# Patient Record
Sex: FEMALE | Race: WHITE | NOT HISPANIC OR LATINO | Employment: FULL TIME | URBAN - METROPOLITAN AREA
[De-identification: names, ages, dates, MRNs, and addresses within clinical notes are randomized per-mention and may not be internally consistent; named-entity substitution may affect disease eponyms.]

---

## 2023-04-12 PROBLEM — R63.5 UNEXPLAINED WEIGHT GAIN: Status: ACTIVE | Noted: 2023-04-12

## 2023-04-12 PROBLEM — R53.81 MALAISE AND FATIGUE: Status: ACTIVE | Noted: 2023-04-12

## 2023-04-12 PROBLEM — J30.2 SEASONAL ALLERGIES: Status: ACTIVE | Noted: 2023-04-12

## 2023-04-12 PROBLEM — R51.9 HEADACHE, ACUTE: Status: ACTIVE | Noted: 2023-04-12

## 2023-04-12 PROBLEM — N89.8 VAGINAL DRYNESS: Status: ACTIVE | Noted: 2023-04-12

## 2023-04-12 PROBLEM — R53.83 FATIGUE: Status: ACTIVE | Noted: 2023-04-12

## 2023-04-12 PROBLEM — K21.9 LARYNGOPHARYNGEAL REFLUX (LPR): Status: ACTIVE | Noted: 2023-04-12

## 2023-04-12 PROBLEM — I10 BENIGN ESSENTIAL HYPERTENSION: Status: ACTIVE | Noted: 2023-04-12

## 2023-04-12 PROBLEM — U07.1 COVID-19 VIRUS INFECTION: Status: ACTIVE | Noted: 2023-04-12

## 2023-04-12 PROBLEM — R09.A2 GLOBUS SENSATION: Status: ACTIVE | Noted: 2023-04-12

## 2023-04-12 PROBLEM — R31.1 BENIGN MICROSCOPIC HEMATURIA: Status: ACTIVE | Noted: 2023-04-12

## 2023-04-12 PROBLEM — L40.9 PSORIASIS OF SCALP: Status: ACTIVE | Noted: 2023-04-12

## 2023-04-12 PROBLEM — Z86.2 HISTORY OF ANEMIA: Status: ACTIVE | Noted: 2023-04-12

## 2023-04-12 PROBLEM — G43.909 MIGRAINE: Status: ACTIVE | Noted: 2023-04-12

## 2023-04-12 PROBLEM — R30.0 BURNING WITH URINATION: Status: ACTIVE | Noted: 2023-04-12

## 2023-04-12 PROBLEM — M54.50 ACUTE EXACERBATION OF CHRONIC LOW BACK PAIN: Status: ACTIVE | Noted: 2023-04-12

## 2023-04-12 PROBLEM — L98.9 SKIN LESION OF FACE: Status: ACTIVE | Noted: 2023-04-12

## 2023-04-12 PROBLEM — R10.9 ABDOMINAL PAIN OF MULTIPLE SITES: Status: ACTIVE | Noted: 2023-04-12

## 2023-04-12 PROBLEM — J31.0 CHRONIC RHINITIS: Status: ACTIVE | Noted: 2023-04-12

## 2023-04-12 PROBLEM — M79.641 PAIN OF RIGHT HAND: Status: ACTIVE | Noted: 2023-04-12

## 2023-04-12 PROBLEM — K64.4 EXTERNAL HEMORRHOIDS: Status: ACTIVE | Noted: 2023-04-12

## 2023-04-12 PROBLEM — R41.3 MEMORY CHANGES: Status: ACTIVE | Noted: 2023-04-12

## 2023-04-12 PROBLEM — R68.82 DECREASED LIBIDO: Status: ACTIVE | Noted: 2023-04-12

## 2023-04-12 PROBLEM — E03.9 ACQUIRED HYPOTHYROIDISM: Status: ACTIVE | Noted: 2023-04-12

## 2023-04-12 PROBLEM — J01.90 ACUTE NON-RECURRENT SINUSITIS: Status: ACTIVE | Noted: 2023-04-12

## 2023-04-12 PROBLEM — R30.9 PAINFUL URINATION: Status: ACTIVE | Noted: 2023-04-12

## 2023-04-12 PROBLEM — R51.9 CHRONIC HEADACHES: Status: ACTIVE | Noted: 2023-04-12

## 2023-04-12 PROBLEM — G89.29 CHRONIC HEADACHES: Status: ACTIVE | Noted: 2023-04-12

## 2023-04-12 PROBLEM — F51.01 PRIMARY INSOMNIA: Status: ACTIVE | Noted: 2023-04-12

## 2023-04-12 PROBLEM — G89.29 ACUTE EXACERBATION OF CHRONIC LOW BACK PAIN: Status: ACTIVE | Noted: 2023-04-12

## 2023-04-12 PROBLEM — M79.646 THUMB PAIN: Status: ACTIVE | Noted: 2023-04-12

## 2023-04-12 PROBLEM — R06.83 SNORING: Status: ACTIVE | Noted: 2023-04-12

## 2023-04-12 PROBLEM — Z86.59 HISTORY OF PANIC ATTACKS: Status: ACTIVE | Noted: 2023-04-12

## 2023-04-12 PROBLEM — R49.9 CHANGE IN VOICE: Status: ACTIVE | Noted: 2023-04-12

## 2023-04-12 PROBLEM — H91.90 SUBJECTIVE HEARING LOSS: Status: ACTIVE | Noted: 2023-04-12

## 2023-04-12 PROBLEM — R41.840 LACK OF CONCENTRATION: Status: ACTIVE | Noted: 2023-04-12

## 2023-04-12 PROBLEM — R09.81 NASAL CONGESTION: Status: ACTIVE | Noted: 2023-04-12

## 2023-04-12 PROBLEM — K64.4 ANAL SKIN TAG: Status: ACTIVE | Noted: 2023-04-12

## 2023-04-12 PROBLEM — N39.0 ACUTE UTI: Status: ACTIVE | Noted: 2023-04-12

## 2023-04-12 PROBLEM — R41.89 BRAIN FOG: Status: ACTIVE | Noted: 2023-04-12

## 2023-04-12 PROBLEM — R09.82 POST-NASAL DRAINAGE: Status: ACTIVE | Noted: 2023-04-12

## 2023-04-12 PROBLEM — J02.9 ACUTE PHARYNGITIS: Status: ACTIVE | Noted: 2023-04-12

## 2023-04-12 PROBLEM — F41.8 DEPRESSION WITH ANXIETY: Status: ACTIVE | Noted: 2023-04-12

## 2023-04-12 PROBLEM — R30.0 DYSURIA: Status: ACTIVE | Noted: 2023-04-12

## 2023-04-12 PROBLEM — J34.89 OTHER SPECIFIED DISORDERS OF NOSE AND NASAL SINUSES: Status: ACTIVE | Noted: 2023-04-12

## 2023-04-12 PROBLEM — M75.81 TENDINITIS OF RIGHT ROTATOR CUFF: Status: ACTIVE | Noted: 2023-04-12

## 2023-04-12 PROBLEM — R80.9 PROTEIN IN URINE: Status: ACTIVE | Noted: 2023-04-12

## 2023-04-12 PROBLEM — K59.04 CHRONIC IDIOPATHIC CONSTIPATION: Status: ACTIVE | Noted: 2023-04-12

## 2023-04-12 PROBLEM — R53.83 MALAISE AND FATIGUE: Status: ACTIVE | Noted: 2023-04-12

## 2023-04-12 RX ORDER — NIFEDIPINE 30 MG/1
1 TABLET, FILM COATED, EXTENDED RELEASE ORAL DAILY
COMMUNITY
Start: 2022-09-08 | End: 2023-04-14 | Stop reason: SINTOL

## 2023-04-12 RX ORDER — BUPROPION HYDROCHLORIDE 300 MG/1
TABLET ORAL
COMMUNITY
End: 2023-04-14

## 2023-04-12 RX ORDER — BUPROPION HYDROCHLORIDE 150 MG/1
1 TABLET ORAL DAILY
COMMUNITY
Start: 2020-09-04 | End: 2023-04-14

## 2023-04-12 RX ORDER — FLUOXETINE HYDROCHLORIDE 20 MG/1
1 CAPSULE ORAL DAILY
COMMUNITY
Start: 2022-09-08

## 2023-04-12 RX ORDER — IBUPROFEN 800 MG/1
TABLET ORAL
COMMUNITY
Start: 2019-04-03 | End: 2023-04-14 | Stop reason: ALTCHOICE

## 2023-04-12 RX ORDER — FLUOXETINE HYDROCHLORIDE 40 MG/1
1 CAPSULE ORAL DAILY
COMMUNITY
Start: 2022-05-09 | End: 2023-04-14

## 2023-04-12 RX ORDER — LEVOTHYROXINE SODIUM 100 UG/1
1 TABLET ORAL DAILY
COMMUNITY
Start: 2021-03-30 | End: 2023-05-10 | Stop reason: SDUPTHER

## 2023-04-12 RX ORDER — TRAZODONE HYDROCHLORIDE 50 MG/1
1 TABLET ORAL NIGHTLY
COMMUNITY
Start: 2020-05-18 | End: 2023-08-05

## 2023-04-12 RX ORDER — CHLORPHENIRAMINE MALEATE 4 MG
1 TABLET ORAL 4 TIMES DAILY
COMMUNITY
Start: 2022-05-06 | End: 2023-04-14

## 2023-04-12 RX ORDER — CLOBETASOL PROPIONATE 0.46 MG/ML
SOLUTION TOPICAL
COMMUNITY
Start: 2019-11-22 | End: 2023-11-15 | Stop reason: SDUPTHER

## 2023-04-14 ENCOUNTER — OFFICE VISIT (OUTPATIENT)
Dept: PRIMARY CARE | Facility: CLINIC | Age: 43
End: 2023-04-14
Payer: COMMERCIAL

## 2023-04-14 ENCOUNTER — APPOINTMENT (OUTPATIENT)
Dept: PRIMARY CARE | Facility: CLINIC | Age: 43
End: 2023-04-14
Payer: COMMERCIAL

## 2023-04-14 VITALS
HEART RATE: 86 BPM | DIASTOLIC BLOOD PRESSURE: 77 MMHG | SYSTOLIC BLOOD PRESSURE: 128 MMHG | TEMPERATURE: 98.5 F | BODY MASS INDEX: 30.18 KG/M2 | RESPIRATION RATE: 20 BRPM | WEIGHT: 165 LBS

## 2023-04-14 DIAGNOSIS — R63.5 UNEXPLAINED WEIGHT GAIN: ICD-10-CM

## 2023-04-14 DIAGNOSIS — E03.9 ACQUIRED HYPOTHYROIDISM: ICD-10-CM

## 2023-04-14 DIAGNOSIS — R60.1 GENERALIZED EDEMA: Primary | ICD-10-CM

## 2023-04-14 DIAGNOSIS — R00.2 HEART PALPITATIONS: ICD-10-CM

## 2023-04-14 DIAGNOSIS — R80.9 PROTEINURIA, UNSPECIFIED TYPE: ICD-10-CM

## 2023-04-14 PROBLEM — J01.90 ACUTE NON-RECURRENT SINUSITIS: Status: RESOLVED | Noted: 2023-04-12 | Resolved: 2023-04-14

## 2023-04-14 PROBLEM — G89.29 ACUTE EXACERBATION OF CHRONIC LOW BACK PAIN: Status: RESOLVED | Noted: 2023-04-12 | Resolved: 2023-04-14

## 2023-04-14 PROBLEM — J02.9 ACUTE PHARYNGITIS: Status: RESOLVED | Noted: 2023-04-12 | Resolved: 2023-04-14

## 2023-04-14 PROBLEM — M54.50 ACUTE EXACERBATION OF CHRONIC LOW BACK PAIN: Status: RESOLVED | Noted: 2023-04-12 | Resolved: 2023-04-14

## 2023-04-14 LAB
ERYTHROCYTE DISTRIBUTION WIDTH (RATIO) BY AUTOMATED COUNT: 12.1 % (ref 11.5–14.5)
ERYTHROCYTE MEAN CORPUSCULAR HEMOGLOBIN CONCENTRATION (G/DL) BY AUTOMATED: 33.3 G/DL (ref 32–36)
ERYTHROCYTE MEAN CORPUSCULAR VOLUME (FL) BY AUTOMATED COUNT: 87 FL (ref 80–100)
ERYTHROCYTES (10*6/UL) IN BLOOD BY AUTOMATED COUNT: 5.16 X10E12/L (ref 4–5.2)
HEMATOCRIT (%) IN BLOOD BY AUTOMATED COUNT: 45 % (ref 36–46)
HEMOGLOBIN (G/DL) IN BLOOD: 15 G/DL (ref 12–16)
LEUKOCYTES (10*3/UL) IN BLOOD BY AUTOMATED COUNT: 10.3 X10E9/L (ref 4.4–11.3)
NRBC (PER 100 WBCS) BY AUTOMATED COUNT: 0 /100 WBC (ref 0–0)
PLATELETS (10*3/UL) IN BLOOD AUTOMATED COUNT: 291 X10E9/L (ref 150–450)

## 2023-04-14 PROCEDURE — 81003 URINALYSIS AUTO W/O SCOPE: CPT

## 2023-04-14 PROCEDURE — 82570 ASSAY OF URINE CREATININE: CPT

## 2023-04-14 PROCEDURE — 3074F SYST BP LT 130 MM HG: CPT | Performed by: INTERNAL MEDICINE

## 2023-04-14 PROCEDURE — 86038 ANTINUCLEAR ANTIBODIES: CPT

## 2023-04-14 PROCEDURE — 84443 ASSAY THYROID STIM HORMONE: CPT

## 2023-04-14 PROCEDURE — 82043 UR ALBUMIN QUANTITATIVE: CPT

## 2023-04-14 PROCEDURE — 85027 COMPLETE CBC AUTOMATED: CPT

## 2023-04-14 PROCEDURE — 99214 OFFICE O/P EST MOD 30 MIN: CPT | Performed by: INTERNAL MEDICINE

## 2023-04-14 PROCEDURE — 80053 COMPREHEN METABOLIC PANEL: CPT

## 2023-04-14 PROCEDURE — 36415 COLL VENOUS BLD VENIPUNCTURE: CPT | Performed by: INTERNAL MEDICINE

## 2023-04-14 PROCEDURE — 86160 COMPLEMENT ANTIGEN: CPT

## 2023-04-14 PROCEDURE — 3078F DIAST BP <80 MM HG: CPT | Performed by: INTERNAL MEDICINE

## 2023-04-14 ASSESSMENT — ENCOUNTER SYMPTOMS
NAUSEA: 0
NUMBNESS: 0
FREQUENCY: 0
DIARRHEA: 0
HEADACHES: 0
UNEXPECTED WEIGHT CHANGE: 1
DIFFICULTY URINATING: 0
FEVER: 0
CONSTIPATION: 1
CHEST TIGHTNESS: 0
ABDOMINAL PAIN: 0
VOMITING: 0
COUGH: 0
SHORTNESS OF BREATH: 0
CHILLS: 0
FATIGUE: 0
PALPITATIONS: 1
DYSURIA: 0
LIGHT-HEADEDNESS: 0

## 2023-04-14 NOTE — ASSESSMENT & PLAN NOTE
Could be side effect from nifedipine  Will have her stop use  Will monitor BP off medication-pt will monitor at home  If elevates we will start new medication

## 2023-04-14 NOTE — PATIENT INSTRUCTIONS
Stop Nifedipine as I am concerned swelling may be side effect from its use   We did blood work today and will call if anything is abnormal  Stop liquid IV packet use and monitor and lessen salt in diet  Based on results we will decide if new med needed for blood pressure and edema  Follow up on May 10th as scheduled  Take BP at home-goal is <140/90 consistently-call sooner if you see this goes up

## 2023-04-14 NOTE — ASSESSMENT & PLAN NOTE
Pt up in weight again for unknown reasons  We will test her thyroid to make sure not the issue  ? If from psych meds

## 2023-04-14 NOTE — ASSESSMENT & PLAN NOTE
Concern for some sort of autoimmune process involving kidney function with protein in urine in past history and now edema  Will do labs including complement and lj testing

## 2023-04-14 NOTE — PROGRESS NOTES
Subjective   Patient ID: Carol Ch is a 42 y.o. female who presents for Edema.    Edema    Associated symptoms include palpitations.   Pertinent negative symptoms include no abdominal pain, no cough, no fatigue, no fever, no nausea and no vomiting.       Pt here in acute visit.  She has noted edema of her hands/feet and face.  She tells me it seems to correlate somewhat with her menstrual cycle but also with when drinking alcohol.  She was using liquid IV packets 1-2 packets a day thinking she needed this for hydration.  She stopped use 3 weeks ago when she realized this may be cause of swelling.  Her weight is up 20 lbs since July of 2022.      She will note increase edema after flying.  She will sometimes note heart racing.  She has history of protein in urine and her family has some kidney history.      Review of Systems   Constitutional:  Positive for unexpected weight change. Negative for chills, fatigue and fever.   Respiratory:  Negative for cough, chest tightness and shortness of breath.    Cardiovascular:  Positive for palpitations and leg swelling.        Edema of hands/face    Gastrointestinal:  Positive for constipation. Negative for abdominal pain, diarrhea, nausea and vomiting.   Genitourinary:  Negative for difficulty urinating, dysuria and frequency.   Neurological:  Negative for light-headedness, numbness and headaches.       Objective   /77   Pulse 86   Temp 36.9 °C (98.5 °F)   Resp 20   Wt 74.8 kg (165 lb)   BMI 30.18 kg/m²    Physical Exam  Constitutional:       Appearance: Normal appearance.   Cardiovascular:      Rate and Rhythm: Normal rate and regular rhythm.      Pulses: Normal pulses.      Heart sounds: Normal heart sounds.   Pulmonary:      Effort: Pulmonary effort is normal.      Breath sounds: Normal breath sounds.   Abdominal:      General: Abdomen is flat. Bowel sounds are normal. There is no distension.      Palpations: Abdomen is soft.      Tenderness: There is no  abdominal tenderness.   Musculoskeletal:      Right lower leg: No edema.      Left lower leg: No edema.      Comments: No edema noted at today's exam    Skin:     Findings: No rash.   Neurological:      Mental Status: She is alert and oriented to person, place, and time.   Psychiatric:         Mood and Affect: Mood normal.         Assessment/Plan   Problem List Items Addressed This Visit          Endocrine/Metabolic    Acquired hypothyroidism    Relevant Orders    TSH    Unexplained weight gain     Pt up in weight again for unknown reasons  We will test her thyroid to make sure not the issue  ? If from psych meds             Other    Protein in urine     Concern for some sort of autoimmune process involving kidney function with protein in urine in past history and now edema  Will do labs including complement and lj testing          Relevant Orders    Albumin , Urine Random    Urinalysis with Reflex Microscopic    C3 complement    C4 complement    LJ with Reflex to JUAN    Generalized edema     Could be side effect from nifedipine  Will have her stop use  Will monitor BP off medication-pt will monitor at home  If elevates we will start new medication          Relevant Orders    Comprehensive Metabolic Panel    CBC    Albumin , Urine Random    Urinalysis with Reflex Microscopic    C3 complement    C4 complement    LJ with Reflex to JUAN     Other Visit Diagnoses       Heart palpitations    -  Primary    Relevant Orders    Comprehensive Metabolic Panel

## 2023-04-15 LAB
ALANINE AMINOTRANSFERASE (SGPT) (U/L) IN SER/PLAS: 14 U/L (ref 7–45)
ALBUMIN (G/DL) IN SER/PLAS: 4.4 G/DL (ref 3.4–5)
ALBUMIN (MG/L) IN URINE: <7 MG/L
ALBUMIN/CREATININE (UG/MG) IN URINE: NORMAL UG/MG CRT (ref 0–30)
ALKALINE PHOSPHATASE (U/L) IN SER/PLAS: 76 U/L (ref 33–110)
ANION GAP IN SER/PLAS: 15 MMOL/L (ref 10–20)
APPEARANCE, URINE: NORMAL
ASPARTATE AMINOTRANSFERASE (SGOT) (U/L) IN SER/PLAS: 16 U/L (ref 9–39)
BILIRUBIN TOTAL (MG/DL) IN SER/PLAS: 0.5 MG/DL (ref 0–1.2)
BILIRUBIN, URINE: NEGATIVE
BLOOD, URINE: NEGATIVE
CALCIUM (MG/DL) IN SER/PLAS: 9.5 MG/DL (ref 8.6–10.6)
CARBON DIOXIDE, TOTAL (MMOL/L) IN SER/PLAS: 23 MMOL/L (ref 21–32)
CHLORIDE (MMOL/L) IN SER/PLAS: 105 MMOL/L (ref 98–107)
COLOR, URINE: YELLOW
COMPLEMENT C3 (MG/DL) IN SER/PLAS: 142 MG/DL (ref 87–200)
COMPLEMENT C4 (MG/DL) IN SER/PLAS: 39 MG/DL (ref 10–50)
CREATININE (MG/DL) IN SER/PLAS: 0.95 MG/DL (ref 0.5–1.05)
CREATININE (MG/DL) IN URINE: 142 MG/DL (ref 20–320)
GFR FEMALE: 77 ML/MIN/1.73M2
GLUCOSE (MG/DL) IN SER/PLAS: 105 MG/DL (ref 74–99)
GLUCOSE, URINE: NEGATIVE MG/DL
KETONES, URINE: NEGATIVE MG/DL
LEUKOCYTE ESTERASE, URINE: NEGATIVE
NITRITE, URINE: NEGATIVE
PH, URINE: 6 (ref 5–8)
POTASSIUM (MMOL/L) IN SER/PLAS: 4 MMOL/L (ref 3.5–5.3)
PROTEIN TOTAL: 7.3 G/DL (ref 6.4–8.2)
PROTEIN, URINE: NEGATIVE MG/DL
SODIUM (MMOL/L) IN SER/PLAS: 139 MMOL/L (ref 136–145)
SPECIFIC GRAVITY, URINE: 1.02 (ref 1–1.03)
THYROTROPIN (MIU/L) IN SER/PLAS BY DETECTION LIMIT <= 0.05 MIU/L: 2.46 MIU/L (ref 0.44–3.98)
UREA NITROGEN (MG/DL) IN SER/PLAS: 18 MG/DL (ref 6–23)
UROBILINOGEN, URINE: <2 MG/DL (ref 0–1.9)

## 2023-04-17 LAB — ANTI-NUCLEAR ANTIBODY (ANA): NEGATIVE

## 2023-04-20 ENCOUNTER — APPOINTMENT (OUTPATIENT)
Dept: PRIMARY CARE | Facility: CLINIC | Age: 43
End: 2023-04-20
Payer: COMMERCIAL

## 2023-04-24 PROBLEM — F33.0 MAJOR DEPRESSIVE DISORDER, RECURRENT EPISODE, MILD (CMS-HCC): Status: ACTIVE | Noted: 2020-10-12

## 2023-04-24 PROBLEM — F41.9 ANXIETY DISORDER: Status: ACTIVE | Noted: 2021-01-11

## 2023-04-24 RX ORDER — BUPROPION HYDROCHLORIDE 150 MG/1
150 TABLET ORAL
COMMUNITY
Start: 2021-02-08 | End: 2023-05-10 | Stop reason: ALTCHOICE

## 2023-04-24 RX ORDER — VERAPAMIL HYDROCHLORIDE 120 MG/1
120 TABLET, FILM COATED, EXTENDED RELEASE ORAL
COMMUNITY
End: 2023-05-10 | Stop reason: ALTCHOICE

## 2023-04-24 RX ORDER — NORETHINDRONE ACETATE AND ETHINYL ESTRADIOL 1MG-20(21)
KIT ORAL
COMMUNITY
Start: 2010-10-04 | End: 2023-05-10 | Stop reason: ALTCHOICE

## 2023-04-24 RX ORDER — OMEPRAZOLE 20 MG/1
20 CAPSULE, DELAYED RELEASE ORAL 2 TIMES DAILY
COMMUNITY
Start: 2022-05-19 | End: 2023-05-10 | Stop reason: ALTCHOICE

## 2023-04-24 RX ORDER — BUPROPION HYDROCHLORIDE 300 MG/1
300 TABLET ORAL
COMMUNITY
Start: 2021-02-08 | End: 2023-05-10 | Stop reason: ALTCHOICE

## 2023-04-24 RX ORDER — NORGESTIMATE AND ETHINYL ESTRADIOL 0.25-0.035
1 KIT ORAL
COMMUNITY
Start: 2011-11-21 | End: 2023-05-10 | Stop reason: ALTCHOICE

## 2023-05-10 ENCOUNTER — OFFICE VISIT (OUTPATIENT)
Dept: PRIMARY CARE | Facility: CLINIC | Age: 43
End: 2023-05-10
Payer: COMMERCIAL

## 2023-05-10 VITALS
DIASTOLIC BLOOD PRESSURE: 84 MMHG | SYSTOLIC BLOOD PRESSURE: 132 MMHG | WEIGHT: 161 LBS | RESPIRATION RATE: 20 BRPM | HEIGHT: 62 IN | BODY MASS INDEX: 29.63 KG/M2 | HEART RATE: 62 BPM

## 2023-05-10 DIAGNOSIS — E03.9 ACQUIRED HYPOTHYROIDISM: ICD-10-CM

## 2023-05-10 DIAGNOSIS — F41.8 DEPRESSION WITH ANXIETY: ICD-10-CM

## 2023-05-10 DIAGNOSIS — G43.009 MIGRAINE WITHOUT AURA AND WITHOUT STATUS MIGRAINOSUS, NOT INTRACTABLE: ICD-10-CM

## 2023-05-10 DIAGNOSIS — J30.2 SEASONAL ALLERGIES: ICD-10-CM

## 2023-05-10 DIAGNOSIS — L73.9 FOLLICULITIS: ICD-10-CM

## 2023-05-10 DIAGNOSIS — R63.5 UNEXPLAINED WEIGHT GAIN: ICD-10-CM

## 2023-05-10 DIAGNOSIS — I10 BENIGN ESSENTIAL HYPERTENSION: ICD-10-CM

## 2023-05-10 DIAGNOSIS — R60.1 GENERALIZED EDEMA: ICD-10-CM

## 2023-05-10 DIAGNOSIS — G89.29 CHRONIC NONINTRACTABLE HEADACHE, UNSPECIFIED HEADACHE TYPE: ICD-10-CM

## 2023-05-10 DIAGNOSIS — E66.3 OVERWEIGHT (BMI 25.0-29.9): ICD-10-CM

## 2023-05-10 DIAGNOSIS — Z00.00 ROUTINE GENERAL MEDICAL EXAMINATION AT A HEALTH CARE FACILITY: Primary | ICD-10-CM

## 2023-05-10 DIAGNOSIS — F51.01 PRIMARY INSOMNIA: ICD-10-CM

## 2023-05-10 DIAGNOSIS — R51.9 CHRONIC NONINTRACTABLE HEADACHE, UNSPECIFIED HEADACHE TYPE: ICD-10-CM

## 2023-05-10 PROBLEM — U07.1 COVID-19 VIRUS INFECTION: Status: RESOLVED | Noted: 2023-04-12 | Resolved: 2023-05-10

## 2023-05-10 PROBLEM — J34.89 OTHER SPECIFIED DISORDERS OF NOSE AND NASAL SINUSES: Status: RESOLVED | Noted: 2023-04-12 | Resolved: 2023-05-10

## 2023-05-10 PROBLEM — R10.9 ABDOMINAL PAIN OF MULTIPLE SITES: Status: RESOLVED | Noted: 2023-04-12 | Resolved: 2023-05-10

## 2023-05-10 PROBLEM — R06.83 SNORING: Status: RESOLVED | Noted: 2023-04-12 | Resolved: 2023-05-10

## 2023-05-10 PROBLEM — N39.0 ACUTE UTI: Status: RESOLVED | Noted: 2023-04-12 | Resolved: 2023-05-10

## 2023-05-10 PROBLEM — L98.9 SKIN LESION OF FACE: Status: RESOLVED | Noted: 2023-04-12 | Resolved: 2023-05-10

## 2023-05-10 PROBLEM — R53.83 FATIGUE: Status: RESOLVED | Noted: 2023-04-12 | Resolved: 2023-05-10

## 2023-05-10 PROBLEM — R09.81 NASAL CONGESTION: Status: RESOLVED | Noted: 2023-04-12 | Resolved: 2023-05-10

## 2023-05-10 PROBLEM — R30.0 BURNING WITH URINATION: Status: RESOLVED | Noted: 2023-04-12 | Resolved: 2023-05-10

## 2023-05-10 PROBLEM — R30.0 DYSURIA: Status: RESOLVED | Noted: 2023-04-12 | Resolved: 2023-05-10

## 2023-05-10 PROBLEM — R09.82 POST-NASAL DRAINAGE: Status: RESOLVED | Noted: 2023-04-12 | Resolved: 2023-05-10

## 2023-05-10 PROBLEM — R30.9 PAINFUL URINATION: Status: RESOLVED | Noted: 2023-04-12 | Resolved: 2023-05-10

## 2023-05-10 PROCEDURE — 3075F SYST BP GE 130 - 139MM HG: CPT | Performed by: INTERNAL MEDICINE

## 2023-05-10 PROCEDURE — 3079F DIAST BP 80-89 MM HG: CPT | Performed by: INTERNAL MEDICINE

## 2023-05-10 PROCEDURE — 1036F TOBACCO NON-USER: CPT | Performed by: INTERNAL MEDICINE

## 2023-05-10 PROCEDURE — 99396 PREV VISIT EST AGE 40-64: CPT | Performed by: INTERNAL MEDICINE

## 2023-05-10 RX ORDER — LEVOTHYROXINE SODIUM 100 UG/1
100 TABLET ORAL DAILY
Qty: 90 TABLET | Refills: 3 | Status: SHIPPED | OUTPATIENT
Start: 2023-05-10 | End: 2024-03-18

## 2023-05-10 RX ORDER — CHLORTHALIDONE 25 MG/1
25 TABLET ORAL DAILY
Qty: 90 TABLET | Refills: 1 | Status: SHIPPED | OUTPATIENT
Start: 2023-05-10 | End: 2023-09-20

## 2023-05-10 ASSESSMENT — ENCOUNTER SYMPTOMS
BACK PAIN: 1
DYSURIA: 0
RECTAL PAIN: 0
ABDOMINAL PAIN: 0
EYE DISCHARGE: 0
FATIGUE: 0
CHEST TIGHTNESS: 0
APNEA: 0
SHORTNESS OF BREATH: 0
NERVOUS/ANXIOUS: 0
PHOTOPHOBIA: 0
APPETITE CHANGE: 0
SINUS PAIN: 0
STRIDOR: 0
EYE REDNESS: 0
FACIAL SWELLING: 0
ANAL BLEEDING: 0
VOICE CHANGE: 0
ACTIVITY CHANGE: 0
BRUISES/BLEEDS EASILY: 0
PALPITATIONS: 0
POLYPHAGIA: 0
FLANK PAIN: 0
SORE THROAT: 0
DYSPHORIC MOOD: 1
DIZZINESS: 0
CHOKING: 0
JOINT SWELLING: 0
DIARRHEA: 0
NECK PAIN: 0
DIFFICULTY URINATING: 0
FREQUENCY: 0
ADENOPATHY: 0
WEAKNESS: 0
CONFUSION: 0
UNEXPECTED WEIGHT CHANGE: 1
MYALGIAS: 0
SINUS PRESSURE: 0
FEVER: 0
NUMBNESS: 0
NAUSEA: 0
CHILLS: 0
ABDOMINAL DISTENTION: 0
DIAPHORESIS: 0
TREMORS: 0
RHINORRHEA: 0
LIGHT-HEADEDNESS: 0
NECK STIFFNESS: 0
HEMATURIA: 0
FACIAL ASYMMETRY: 0
HEADACHES: 0
SPEECH DIFFICULTY: 0
ARTHRALGIAS: 0
VOMITING: 0
BLOOD IN STOOL: 0
EYE PAIN: 0
CONSTIPATION: 1
HYPERACTIVE: 0
AGITATION: 0
COLOR CHANGE: 0
SLEEP DISTURBANCE: 0
WHEEZING: 0
EYE ITCHING: 0
TROUBLE SWALLOWING: 0
POLYDIPSIA: 0
DECREASED CONCENTRATION: 0
WOUND: 0
HALLUCINATIONS: 0
SEIZURES: 0
COUGH: 0

## 2023-05-10 NOTE — PATIENT INSTRUCTIONS
Start Chlorthalidone once you receive it for blood pressure (mild diuretic so will help with fluid excess)-one a day  Get blood work 7-10 days after you start the new medicine (order is in system-non fasting)  Continue other meds as directed  Talk to new psychiatrist about SSRI use (Fluoxetine) and weight gain-see if other options  Work hard to start to exercise with goal of 5 days 30 minutes each time  Take warm bath once a day and use aquaphor to area suprapubic region for resolving boil (this was from blocked hair follicle)  Moisturize skin will especially of back  Follow up in 6 months

## 2023-05-10 NOTE — ASSESSMENT & PLAN NOTE
May be from SSRI use  She will talk with psych (new) about this  Encouraged her to watch diet more closely-log calories and to start to exercise with goal of 150 minutes/week

## 2023-05-10 NOTE — ASSESSMENT & PLAN NOTE
Off Nifedipine due to LE edema  Will start low dose Chlorthalidone as she feels she retains fluids easily   BMP to be done 7-10 days after she starts medication

## 2023-05-10 NOTE — PROGRESS NOTES
Subjective   Patient ID: Carol Ch is a 42 y.o. female who presents for Annual Exam.    HPI    Pt here for physical.  She is up in weight.  She feels her diet and activity is the same from last year.  She is up over 10 lbs.      She stopped Nifedipine due to LE edema.  Her BP is in the 130's at home.      She sees a GYN through .  She went in summer for her visit.  She has a skin lesion on top of labia.  The area can drain and it is is still raised.  She noticed it about 2 weeks ago.      She does not feel her mood is good.  She is not taking her Fluoxetine as she should.  She is irritated and doesn't feel like herself.  Sleep is ok with trazodone.  She sees psych and counselor.      She had labs in 4/2023 that were overall normal.  She had done cholesterol in 10/2022 that showed LDL of 132.      She is on Nurtec for headaches and this works.          Review of Systems   Constitutional:  Positive for unexpected weight change. Negative for activity change, appetite change, chills, diaphoresis, fatigue and fever.   HENT:  Negative for congestion, dental problem, drooling, ear discharge, ear pain, facial swelling, hearing loss, mouth sores, nosebleeds, postnasal drip, rhinorrhea, sinus pressure, sinus pain, sneezing, sore throat, tinnitus, trouble swallowing and voice change.    Eyes:  Negative for photophobia, pain, discharge, redness, itching and visual disturbance.   Respiratory:  Negative for apnea, cough, choking, chest tightness, shortness of breath, wheezing and stridor.    Cardiovascular:  Negative for chest pain, palpitations and leg swelling.   Gastrointestinal:  Positive for constipation. Negative for abdominal distention, abdominal pain, anal bleeding, blood in stool, diarrhea, nausea, rectal pain and vomiting.   Endocrine: Negative for cold intolerance, heat intolerance, polydipsia, polyphagia and polyuria.   Genitourinary:  Negative for decreased urine volume, difficulty urinating, dyspareunia,  "dysuria, enuresis, flank pain, frequency, genital sores, hematuria, menstrual problem, pelvic pain, urgency, vaginal bleeding, vaginal discharge and vaginal pain.   Musculoskeletal:  Positive for back pain. Negative for arthralgias, gait problem, joint swelling, myalgias, neck pain and neck stiffness.   Skin:  Negative for color change, pallor, rash and wound.        Boil on labia   Allergic/Immunologic: Negative for environmental allergies, food allergies and immunocompromised state.   Neurological:  Negative for dizziness, tremors, seizures, syncope, facial asymmetry, speech difficulty, weakness, light-headedness, numbness and headaches.   Hematological:  Negative for adenopathy. Does not bruise/bleed easily.   Psychiatric/Behavioral:  Positive for dysphoric mood. Negative for agitation, behavioral problems, confusion, decreased concentration, hallucinations, self-injury, sleep disturbance and suicidal ideas. The patient is not nervous/anxious and is not hyperactive.        Objective   /84   Pulse 62   Resp 20   Ht 1.575 m (5' 2\")   Wt 73 kg (161 lb)   BMI 29.45 kg/m²    Physical Exam  Constitutional:       Appearance: Normal appearance.   HENT:      Head: Normocephalic and atraumatic.      Right Ear: Tympanic membrane, ear canal and external ear normal. There is no impacted cerumen.      Left Ear: Tympanic membrane, ear canal and external ear normal. There is no impacted cerumen.      Nose: Nose normal. No congestion or rhinorrhea.      Mouth/Throat:      Mouth: Mucous membranes are moist.      Pharynx: Oropharynx is clear. No oropharyngeal exudate or posterior oropharyngeal erythema.   Eyes:      Extraocular Movements: Extraocular movements intact.      Conjunctiva/sclera: Conjunctivae normal.      Pupils: Pupils are equal, round, and reactive to light.   Neck:      Vascular: No carotid bruit.   Cardiovascular:      Rate and Rhythm: Normal rate and regular rhythm.      Pulses: Normal pulses.      " Heart sounds: Normal heart sounds. No murmur heard.  Pulmonary:      Effort: Pulmonary effort is normal. No respiratory distress.      Breath sounds: Normal breath sounds. No wheezing, rhonchi or rales.   Abdominal:      General: Abdomen is flat. Bowel sounds are normal. There is no distension.      Palpations: Abdomen is soft.      Tenderness: There is no abdominal tenderness.      Hernia: No hernia is present.   Genitourinary:     Comments: Top suprapubic area with resolving boil noted, no fluctuance   No redness or drainage to small lesion   Musculoskeletal:         General: No swelling or tenderness. Normal range of motion.      Cervical back: Normal range of motion and neck supple.      Right lower leg: No edema.      Left lower leg: No edema.   Lymphadenopathy:      Cervical: No cervical adenopathy.   Skin:     General: Skin is warm and dry.      Findings: No lesion or rash.   Neurological:      General: No focal deficit present.      Mental Status: She is alert and oriented to person, place, and time.      Cranial Nerves: No cranial nerve deficit.      Sensory: No sensory deficit.      Motor: No weakness.   Psychiatric:         Mood and Affect: Mood normal.         Behavior: Behavior normal.         Thought Content: Thought content normal.         Judgment: Judgment normal.         Assessment/Plan   Problem List Items Addressed This Visit          Nervous    Chronic headaches    Primary insomnia       Circulatory    Benign essential hypertension     Off Nifedipine due to LE edema  Will start low dose Chlorthalidone as she feels she retains fluids easily   BMP to be done 7-10 days after she starts medication          Relevant Medications    chlorthalidone (Hygroton) 25 mg tablet    Other Relevant Orders    Basic Metabolic Panel    Follow Up In Primary Care       Endocrine/Metabolic    Acquired hypothyroidism     TSH done in 4/2023 was noted to be normal  Refill on medication done today at current dose           Relevant Medications    levothyroxine (Synthroid, Levoxyl) 100 mcg tablet    Unexplained weight gain     May be from SSRI use  She will talk with psych (new) about this  Encouraged her to watch diet more closely-log calories and to start to exercise with goal of 150 minutes/week             Other    Depression with anxiety     Her mood is not great right now  Stressed with work  Has upcoming appointment with new psychiatrist to establish  On SSRI but having some weight gain that may be due to this          Migraine     On Nurtec every other day with good results   Continue use          Seasonal allergies    Generalized edema     Other Visit Diagnoses       Routine general medical examination at a health care facility    -  Primary    Folliculitis

## 2023-05-10 NOTE — ASSESSMENT & PLAN NOTE
Her mood is not great right now  Stressed with work  Has upcoming appointment with new psychiatrist to establish  On SSRI but having some weight gain that may be due to this

## 2023-05-11 RX ORDER — SEMAGLUTIDE 0.25 MG/.5ML
0.25 INJECTION, SOLUTION SUBCUTANEOUS
Qty: 2 ML | Refills: 0 | Status: SHIPPED | OUTPATIENT
Start: 2023-05-11 | End: 2023-11-15 | Stop reason: WASHOUT

## 2023-08-01 DIAGNOSIS — F51.01 PRIMARY INSOMNIA: ICD-10-CM

## 2023-08-05 RX ORDER — TRAZODONE HYDROCHLORIDE 50 MG/1
TABLET ORAL NIGHTLY
Qty: 90 TABLET | Refills: 1 | Status: SHIPPED | OUTPATIENT
Start: 2023-08-05 | End: 2023-08-10

## 2023-08-10 DIAGNOSIS — F51.01 PRIMARY INSOMNIA: ICD-10-CM

## 2023-08-10 RX ORDER — TRAZODONE HYDROCHLORIDE 50 MG/1
50 TABLET ORAL NIGHTLY
Qty: 90 TABLET | Refills: 1 | Status: SHIPPED | OUTPATIENT
Start: 2023-08-10 | End: 2023-12-28 | Stop reason: SDUPTHER

## 2023-08-10 RX ORDER — TRAZODONE HYDROCHLORIDE 50 MG/1
50 TABLET ORAL NIGHTLY
Qty: 90 TABLET | Refills: 1 | Status: SHIPPED | OUTPATIENT
Start: 2023-08-10 | End: 2023-08-10 | Stop reason: SDUPTHER

## 2023-09-20 DIAGNOSIS — I10 BENIGN ESSENTIAL HYPERTENSION: ICD-10-CM

## 2023-09-20 RX ORDER — CHLORTHALIDONE 25 MG/1
25 TABLET ORAL DAILY
Qty: 90 TABLET | Refills: 3 | Status: SHIPPED | OUTPATIENT
Start: 2023-09-20

## 2023-10-19 RX ORDER — BUPROPION HYDROCHLORIDE 150 MG/1
150 TABLET ORAL EVERY MORNING
COMMUNITY
Start: 2023-07-07 | End: 2023-11-15 | Stop reason: ALTCHOICE

## 2023-10-19 RX ORDER — BUPROPION HYDROCHLORIDE 300 MG/1
300 TABLET ORAL EVERY MORNING
COMMUNITY
Start: 2023-07-10

## 2023-11-15 ENCOUNTER — OFFICE VISIT (OUTPATIENT)
Dept: PRIMARY CARE | Facility: CLINIC | Age: 43
End: 2023-11-15
Payer: COMMERCIAL

## 2023-11-15 VITALS
WEIGHT: 157.2 LBS | HEART RATE: 72 BPM | DIASTOLIC BLOOD PRESSURE: 70 MMHG | BODY MASS INDEX: 28.75 KG/M2 | SYSTOLIC BLOOD PRESSURE: 104 MMHG

## 2023-11-15 DIAGNOSIS — I10 BENIGN ESSENTIAL HYPERTENSION: ICD-10-CM

## 2023-11-15 DIAGNOSIS — E03.9 ACQUIRED HYPOTHYROIDISM: Primary | ICD-10-CM

## 2023-11-15 DIAGNOSIS — J31.0 CHRONIC RHINITIS: ICD-10-CM

## 2023-11-15 DIAGNOSIS — Z23 NEED FOR INFLUENZA VACCINATION: ICD-10-CM

## 2023-11-15 DIAGNOSIS — F41.1 GENERALIZED ANXIETY DISORDER: ICD-10-CM

## 2023-11-15 DIAGNOSIS — L40.9 PSORIASIS OF SCALP: ICD-10-CM

## 2023-11-15 DIAGNOSIS — F33.0 MAJOR DEPRESSIVE DISORDER, RECURRENT EPISODE, MILD (CMS-HCC): ICD-10-CM

## 2023-11-15 DIAGNOSIS — G43.109 MIGRAINE WITH AURA AND WITHOUT STATUS MIGRAINOSUS, NOT INTRACTABLE: ICD-10-CM

## 2023-11-15 DIAGNOSIS — F51.01 PRIMARY INSOMNIA: ICD-10-CM

## 2023-11-15 DIAGNOSIS — Z13.220 SCREENING FOR LIPID DISORDERS: ICD-10-CM

## 2023-11-15 PROBLEM — R63.5 UNEXPLAINED WEIGHT GAIN: Status: RESOLVED | Noted: 2023-04-12 | Resolved: 2023-11-15

## 2023-11-15 PROCEDURE — 99214 OFFICE O/P EST MOD 30 MIN: CPT | Performed by: INTERNAL MEDICINE

## 2023-11-15 PROCEDURE — 3074F SYST BP LT 130 MM HG: CPT | Performed by: INTERNAL MEDICINE

## 2023-11-15 PROCEDURE — 3078F DIAST BP <80 MM HG: CPT | Performed by: INTERNAL MEDICINE

## 2023-11-15 PROCEDURE — 90471 IMMUNIZATION ADMIN: CPT | Performed by: INTERNAL MEDICINE

## 2023-11-15 PROCEDURE — 90686 IIV4 VACC NO PRSV 0.5 ML IM: CPT | Performed by: INTERNAL MEDICINE

## 2023-11-15 PROCEDURE — 36415 COLL VENOUS BLD VENIPUNCTURE: CPT

## 2023-11-15 PROCEDURE — 1036F TOBACCO NON-USER: CPT | Performed by: INTERNAL MEDICINE

## 2023-11-15 PROCEDURE — 80061 LIPID PANEL: CPT

## 2023-11-15 RX ORDER — CLOBETASOL PROPIONATE 0.46 MG/ML
SOLUTION TOPICAL
Qty: 50 ML | Refills: 3 | Status: SHIPPED | OUTPATIENT
Start: 2023-11-15

## 2023-11-15 ASSESSMENT — ENCOUNTER SYMPTOMS
FATIGUE: 1
ADENOPATHY: 0
CHILLS: 0
DYSPHORIC MOOD: 1
NAUSEA: 0
WHEEZING: 0
ARTHRALGIAS: 0
VOMITING: 0
CHEST TIGHTNESS: 0
SLEEP DISTURBANCE: 1
DIZZINESS: 0
COUGH: 0
ACTIVITY CHANGE: 0
LIGHT-HEADEDNESS: 0
UNEXPECTED WEIGHT CHANGE: 0
SHORTNESS OF BREATH: 0
DIARRHEA: 0
WEAKNESS: 0
DIFFICULTY URINATING: 0
FEVER: 0
DYSURIA: 0
APPETITE CHANGE: 0
PALPITATIONS: 0
CONSTIPATION: 0
NUMBNESS: 0
ABDOMINAL PAIN: 0
NERVOUS/ANXIOUS: 0
FREQUENCY: 0

## 2023-11-15 NOTE — ASSESSMENT & PLAN NOTE
Pt on Trazodone 50 mg nightly  Discussed if feeling more fatigued through early part of day/brain fog that she could try to reduce dose to 25 mg/night and/or try to even come off if able

## 2023-11-15 NOTE — ASSESSMENT & PLAN NOTE
Mood stable but motivation still down  On Wellbutrin at 300 mg and prozac 20 mg  She is currently single mom through week as  working in [place]

## 2023-11-15 NOTE — PATIENT INSTRUCTIONS
Start to take antihistamine daily (claritin for example) and/or add Flonase as well to help manage constant pressure in head/nasal drainage  Do this consistently/daily for next 1 month  If brain fog feels bad early on in day may be due to Trazodone-could lessen dose and/or stop use   We did blood work today-small amount only  You got your flu shot today  Continue medications-we sent in refills that were needed  Continue healthy diet and exercise regularly for weight control/stress relief and general cardiovascular improvements  Follow up here in 6-8 months for full physical

## 2023-11-15 NOTE — PROGRESS NOTES
Subjective   Patient ID: Carol Ch is a 43 y.o. female who presents for Follow-up (6m).    HPI    Pt here for 6 month follow up.  She is down in weight.  She tells me her appetite is down.  She has some stress at home- is away during the week for work and then comes into town on weekends only.      Her mood is stable on Wellbutrin 300 mg/day.  She is also on Fluoxetine.  Her motivation at times is low.  She is sleeping ok-has older dog that will wake her.      She continues to have tension based headaches.  She will use Nurtec as needed for migraine based ones.  She feels she will has pressure in her head almost daily.          Review of Systems   Constitutional:  Positive for fatigue. Negative for activity change, appetite change, chills, fever and unexpected weight change.   HENT:  Positive for postnasal drip.    Respiratory:  Negative for cough, chest tightness, shortness of breath and wheezing.    Cardiovascular:  Negative for chest pain, palpitations and leg swelling.   Gastrointestinal:  Negative for abdominal pain, constipation, diarrhea, nausea and vomiting.   Genitourinary:  Negative for difficulty urinating, dysuria and frequency.   Musculoskeletal:  Negative for arthralgias.   Neurological:  Negative for dizziness, weakness, light-headedness and numbness.   Hematological:  Negative for adenopathy.   Psychiatric/Behavioral:  Positive for dysphoric mood and sleep disturbance. The patient is not nervous/anxious.        Objective   /70 (BP Location: Left arm, Patient Position: Sitting)   Pulse 72   Wt 71.3 kg (157 lb 3.2 oz)   BMI 28.75 kg/m²    Physical Exam  Constitutional:       Appearance: Normal appearance.   Cardiovascular:      Rate and Rhythm: Normal rate and regular rhythm.      Pulses: Normal pulses.      Heart sounds: Normal heart sounds.   Pulmonary:      Effort: Pulmonary effort is normal.      Breath sounds: Normal breath sounds.   Abdominal:      General: Bowel sounds are  normal.      Palpations: Abdomen is soft.   Musculoskeletal:      Right lower leg: No edema.      Left lower leg: No edema.   Lymphadenopathy:      Cervical: No cervical adenopathy.   Neurological:      Mental Status: She is alert and oriented to person, place, and time.   Psychiatric:         Mood and Affect: Mood normal.         Behavior: Behavior normal.         Thought Content: Thought content normal.         Judgment: Judgment normal.         Assessment/Plan   Problem List Items Addressed This Visit       Acquired hypothyroidism - Primary     Had normal TSH in May  On Levothyroxine          Relevant Orders    Follow Up In Primary Care - Health Maintenance    Benign essential hypertension     Well controlled on Chlorthalidone          Relevant Orders    Follow Up In Primary Care - Health Maintenance    Chronic rhinitis    Relevant Orders    Follow Up In Primary Care - Health Maintenance    Migraine    Relevant Medications    rimegepant (NURTEC) 75 mg tablet,disintegrating    Other Relevant Orders    Follow Up In Primary Care - Health Maintenance    Primary insomnia     Pt on Trazodone 50 mg nightly  Discussed if feeling more fatigued through early part of day/brain fog that she could try to reduce dose to 25 mg/night and/or try to even come off if able          Relevant Orders    Follow Up In Primary Care - Health Maintenance    Psoriasis of scalp     Uses clobetasol with good relief          Relevant Medications    clobetasol (Temovate) 0.05 % external solution    Other Relevant Orders    Follow Up In Primary Care - Health Maintenance    Major depressive disorder, recurrent episode, mild (CMS/HCC)     Mood stable but motivation still down  On Wellbutrin at 300 mg and prozac 20 mg  She is currently single mom through week as  working in [place]         Relevant Orders    Follow Up In Primary Care - Health Maintenance    Anxiety disorder    Relevant Orders    Follow Up In Primary Care - Health Maintenance      Other Visit Diagnoses       Need for influenza vaccination        Relevant Orders    Flu vaccine (IIV4) age 6 months and greater, preservative free (Completed)    Follow Up In Primary Care - Health Maintenance    Screening for lipid disorders        Relevant Orders    Lipid Panel    Follow Up In Primary Care - Health Maintenance

## 2023-11-16 LAB
CHOLEST SERPL-MCNC: 211 MG/DL (ref 0–199)
CHOLESTEROL/HDL RATIO: 4.1
HDLC SERPL-MCNC: 50.9 MG/DL
LDLC SERPL CALC-MCNC: 137 MG/DL
NON HDL CHOLESTEROL: 160 MG/DL (ref 0–149)
TRIGL SERPL-MCNC: 115 MG/DL (ref 0–149)
VLDL: 23 MG/DL (ref 0–40)

## 2023-12-14 DIAGNOSIS — G43.109 MIGRAINE WITH AURA AND WITHOUT STATUS MIGRAINOSUS, NOT INTRACTABLE: ICD-10-CM

## 2023-12-28 DIAGNOSIS — F51.01 PRIMARY INSOMNIA: ICD-10-CM

## 2023-12-28 RX ORDER — TRAZODONE HYDROCHLORIDE 50 MG/1
50 TABLET ORAL NIGHTLY
Qty: 90 TABLET | Refills: 1 | Status: SHIPPED | OUTPATIENT
Start: 2023-12-28

## 2024-03-18 DIAGNOSIS — E03.9 ACQUIRED HYPOTHYROIDISM: ICD-10-CM

## 2024-03-18 RX ORDER — LEVOTHYROXINE SODIUM 100 UG/1
100 TABLET ORAL DAILY
Qty: 90 TABLET | Refills: 3 | Status: SHIPPED | OUTPATIENT
Start: 2024-03-18

## 2024-06-10 ENCOUNTER — APPOINTMENT (OUTPATIENT)
Dept: PRIMARY CARE | Facility: CLINIC | Age: 44
End: 2024-06-10
Payer: COMMERCIAL

## 2024-06-17 ENCOUNTER — APPOINTMENT (OUTPATIENT)
Dept: PRIMARY CARE | Facility: CLINIC | Age: 44
End: 2024-06-17
Payer: COMMERCIAL

## 2024-06-17 VITALS
BODY MASS INDEX: 28.85 KG/M2 | DIASTOLIC BLOOD PRESSURE: 78 MMHG | HEIGHT: 62 IN | SYSTOLIC BLOOD PRESSURE: 110 MMHG | HEART RATE: 72 BPM | WEIGHT: 156.8 LBS | RESPIRATION RATE: 16 BRPM

## 2024-06-17 DIAGNOSIS — Z13.220 SCREENING FOR LIPID DISORDERS: ICD-10-CM

## 2024-06-17 DIAGNOSIS — J30.2 SEASONAL ALLERGIES: ICD-10-CM

## 2024-06-17 DIAGNOSIS — F41.1 GENERALIZED ANXIETY DISORDER: ICD-10-CM

## 2024-06-17 DIAGNOSIS — F51.01 PRIMARY INSOMNIA: ICD-10-CM

## 2024-06-17 DIAGNOSIS — E66.3 OVERWEIGHT WITH BODY MASS INDEX (BMI) OF 28 TO 28.9 IN ADULT: ICD-10-CM

## 2024-06-17 DIAGNOSIS — J31.0 CHRONIC RHINITIS: ICD-10-CM

## 2024-06-17 DIAGNOSIS — L40.9 PSORIASIS OF SCALP: ICD-10-CM

## 2024-06-17 DIAGNOSIS — F33.0 MAJOR DEPRESSIVE DISORDER, RECURRENT EPISODE, MILD (CMS-HCC): ICD-10-CM

## 2024-06-17 DIAGNOSIS — E03.9 ACQUIRED HYPOTHYROIDISM: ICD-10-CM

## 2024-06-17 DIAGNOSIS — Z00.00 ROUTINE GENERAL MEDICAL EXAMINATION AT A HEALTH CARE FACILITY: Primary | ICD-10-CM

## 2024-06-17 DIAGNOSIS — I10 BENIGN ESSENTIAL HYPERTENSION: ICD-10-CM

## 2024-06-17 DIAGNOSIS — G44.229 CHRONIC TENSION-TYPE HEADACHE, NOT INTRACTABLE: ICD-10-CM

## 2024-06-17 DIAGNOSIS — G43.109 MIGRAINE WITH AURA AND WITHOUT STATUS MIGRAINOSUS, NOT INTRACTABLE: ICD-10-CM

## 2024-06-17 PROCEDURE — 3074F SYST BP LT 130 MM HG: CPT | Performed by: INTERNAL MEDICINE

## 2024-06-17 PROCEDURE — 99396 PREV VISIT EST AGE 40-64: CPT | Performed by: INTERNAL MEDICINE

## 2024-06-17 PROCEDURE — 1036F TOBACCO NON-USER: CPT | Performed by: INTERNAL MEDICINE

## 2024-06-17 PROCEDURE — 3008F BODY MASS INDEX DOCD: CPT | Performed by: INTERNAL MEDICINE

## 2024-06-17 PROCEDURE — 3078F DIAST BP <80 MM HG: CPT | Performed by: INTERNAL MEDICINE

## 2024-06-17 RX ORDER — METFORMIN HYDROCHLORIDE 500 MG/1
1500 TABLET, EXTENDED RELEASE ORAL
COMMUNITY
Start: 2024-04-17

## 2024-06-17 RX ORDER — HYDROXYZINE HYDROCHLORIDE 25 MG/1
25 TABLET, FILM COATED ORAL EVERY 6 HOURS PRN
Qty: 90 TABLET | Refills: 1 | Status: SHIPPED | OUTPATIENT
Start: 2024-06-17 | End: 2024-08-01

## 2024-06-17 RX ORDER — PROGESTERONE 100 MG/1
CAPSULE ORAL
COMMUNITY
Start: 2024-06-14

## 2024-06-17 ASSESSMENT — ENCOUNTER SYMPTOMS
APPETITE CHANGE: 0
ARTHRALGIAS: 0
DYSURIA: 0
EYE DISCHARGE: 0
VOMITING: 0
NECK STIFFNESS: 0
DIARRHEA: 0
BRUISES/BLEEDS EASILY: 0
WOUND: 0
SEIZURES: 0
HEMATURIA: 0
CONFUSION: 0
ADENOPATHY: 0
CONSTIPATION: 0
EYE PAIN: 0
POLYPHAGIA: 0
FEVER: 0
ANAL BLEEDING: 0
DIZZINESS: 0
MYALGIAS: 0
TROUBLE SWALLOWING: 0
BLOOD IN STOOL: 0
ABDOMINAL DISTENTION: 0
AGITATION: 0
BACK PAIN: 1
DYSPHORIC MOOD: 0
HEADACHES: 1
HYPERACTIVE: 0
SPEECH DIFFICULTY: 0
DIAPHORESIS: 0
FATIGUE: 1
SINUS PAIN: 0
NAUSEA: 0
SLEEP DISTURBANCE: 1
PHOTOPHOBIA: 0
CHOKING: 0
FACIAL SWELLING: 0
JOINT SWELLING: 0
NUMBNESS: 0
COLOR CHANGE: 0
HALLUCINATIONS: 0
FACIAL ASYMMETRY: 0
TREMORS: 0
CHILLS: 0
SINUS PRESSURE: 0
SHORTNESS OF BREATH: 0
EYE ITCHING: 0
PALPITATIONS: 0
NECK PAIN: 0
STRIDOR: 0
ACTIVITY CHANGE: 0
UNEXPECTED WEIGHT CHANGE: 0
LIGHT-HEADEDNESS: 0
FREQUENCY: 0
RHINORRHEA: 0
RECTAL PAIN: 0
WHEEZING: 0
FLANK PAIN: 0
POLYDIPSIA: 0
COUGH: 0
ABDOMINAL PAIN: 0
NERVOUS/ANXIOUS: 0
WEAKNESS: 0
SORE THROAT: 0
VOICE CHANGE: 0
EYE REDNESS: 0
DECREASED CONCENTRATION: 0
CHEST TIGHTNESS: 0
DIFFICULTY URINATING: 0
APNEA: 0

## 2024-06-17 ASSESSMENT — PATIENT HEALTH QUESTIONNAIRE - PHQ9
SUM OF ALL RESPONSES TO PHQ9 QUESTIONS 1 AND 2: 0
1. LITTLE INTEREST OR PLEASURE IN DOING THINGS: NOT AT ALL
2. FEELING DOWN, DEPRESSED OR HOPELESS: NOT AT ALL

## 2024-06-17 ASSESSMENT — PROMIS GLOBAL HEALTH SCALE
RATE_PHYSICAL_HEALTH: GOOD
EMOTIONAL_PROBLEMS: SOMETIMES
RATE_QUALITY_OF_LIFE: GOOD
RATE_AVERAGE_PAIN: 5
RATE_SOCIAL_SATISFACTION: FAIR
CARRYOUT_PHYSICAL_ACTIVITIES: COMPLETELY
RATE_MENTAL_HEALTH: FAIR
RATE_GENERAL_HEALTH: GOOD
CARRYOUT_SOCIAL_ACTIVITIES: GOOD
RATE_AVERAGE_FATIGUE: MODERATE

## 2024-06-17 NOTE — ASSESSMENT & PLAN NOTE
BP well controlled off meds  Uses Chlorthalidone only if she notes swelling which she states only has occurred when traveling
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
## 2024-06-17 NOTE — PATIENT INSTRUCTIONS
Continue all meds as directed  When able stop at  lab for fasting blood work-no food after midnight-can have black coffee/water and pills in AM  Continue to work on weight loss with healthy diet-lean protein/fish/veggies and low carb/low sugar  Exercise regularly with goal of 150 minutes/week  Continue to see chiropractor to help manage low back pain  Continue to see counselor and psychiatrist as directed  See GYN in summer for annual and have mammograms when due  Follow up here in 1 year

## 2024-07-16 ENCOUNTER — LAB (OUTPATIENT)
Dept: LAB | Facility: LAB | Age: 44
End: 2024-07-16
Payer: COMMERCIAL

## 2024-07-16 DIAGNOSIS — I10 BENIGN ESSENTIAL HYPERTENSION: ICD-10-CM

## 2024-07-16 DIAGNOSIS — Z13.220 SCREENING FOR LIPID DISORDERS: ICD-10-CM

## 2024-07-16 DIAGNOSIS — E03.9 ACQUIRED HYPOTHYROIDISM: ICD-10-CM

## 2024-07-16 DIAGNOSIS — Z00.00 ROUTINE GENERAL MEDICAL EXAMINATION AT A HEALTH CARE FACILITY: ICD-10-CM

## 2024-07-16 LAB
ALBUMIN SERPL BCP-MCNC: 4 G/DL (ref 3.4–5)
ALP SERPL-CCNC: 66 U/L (ref 33–110)
ALT SERPL W P-5'-P-CCNC: 16 U/L (ref 7–45)
ANION GAP SERPL CALC-SCNC: 11 MMOL/L (ref 10–20)
AST SERPL W P-5'-P-CCNC: 14 U/L (ref 9–39)
BILIRUB SERPL-MCNC: 0.5 MG/DL (ref 0–1.2)
BUN SERPL-MCNC: 13 MG/DL (ref 6–23)
CALCIUM SERPL-MCNC: 9.1 MG/DL (ref 8.6–10.6)
CHLORIDE SERPL-SCNC: 104 MMOL/L (ref 98–107)
CHOLEST SERPL-MCNC: 207 MG/DL (ref 0–199)
CHOLESTEROL/HDL RATIO: 3.9
CO2 SERPL-SCNC: 27 MMOL/L (ref 21–32)
CREAT SERPL-MCNC: 0.87 MG/DL (ref 0.5–1.05)
EGFRCR SERPLBLD CKD-EPI 2021: 85 ML/MIN/1.73M*2
ERYTHROCYTE [DISTWIDTH] IN BLOOD BY AUTOMATED COUNT: 12.2 % (ref 11.5–14.5)
GLUCOSE SERPL-MCNC: 89 MG/DL (ref 74–99)
HCT VFR BLD AUTO: 42 % (ref 36–46)
HDLC SERPL-MCNC: 53.1 MG/DL
HGB BLD-MCNC: 13.7 G/DL (ref 12–16)
LDLC SERPL CALC-MCNC: 132 MG/DL
MCH RBC QN AUTO: 28.8 PG (ref 26–34)
MCHC RBC AUTO-ENTMCNC: 32.6 G/DL (ref 32–36)
MCV RBC AUTO: 88 FL (ref 80–100)
NON HDL CHOLESTEROL: 154 MG/DL (ref 0–149)
NRBC BLD-RTO: 0 /100 WBCS (ref 0–0)
PLATELET # BLD AUTO: 248 X10*3/UL (ref 150–450)
POTASSIUM SERPL-SCNC: 4.3 MMOL/L (ref 3.5–5.3)
PROT SERPL-MCNC: 6.8 G/DL (ref 6.4–8.2)
RBC # BLD AUTO: 4.75 X10*6/UL (ref 4–5.2)
SODIUM SERPL-SCNC: 138 MMOL/L (ref 136–145)
TRIGL SERPL-MCNC: 109 MG/DL (ref 0–149)
TSH SERPL-ACNC: 2.78 MIU/L (ref 0.44–3.98)
VLDL: 22 MG/DL (ref 0–40)
WBC # BLD AUTO: 9.4 X10*3/UL (ref 4.4–11.3)

## 2024-07-16 PROCEDURE — 84443 ASSAY THYROID STIM HORMONE: CPT

## 2024-07-16 PROCEDURE — 36415 COLL VENOUS BLD VENIPUNCTURE: CPT

## 2024-07-16 PROCEDURE — 80061 LIPID PANEL: CPT

## 2024-07-16 PROCEDURE — 80053 COMPREHEN METABOLIC PANEL: CPT

## 2024-07-16 PROCEDURE — 85027 COMPLETE CBC AUTOMATED: CPT

## 2024-08-12 ENCOUNTER — TELEPHONE (OUTPATIENT)
Dept: PRIMARY CARE | Facility: CLINIC | Age: 44
End: 2024-08-12

## 2024-08-12 ENCOUNTER — OFFICE VISIT (OUTPATIENT)
Dept: PRIMARY CARE | Facility: CLINIC | Age: 44
End: 2024-08-12
Payer: COMMERCIAL

## 2024-08-12 DIAGNOSIS — N39.0 URINARY TRACT INFECTION WITHOUT HEMATURIA, SITE UNSPECIFIED: ICD-10-CM

## 2024-08-12 LAB
APPEARANCE UR: ABNORMAL
BACTERIA #/AREA URNS AUTO: ABNORMAL /HPF
BILIRUB UR STRIP.AUTO-MCNC: ABNORMAL MG/DL
COLOR UR: ABNORMAL
GLUCOSE UR STRIP.AUTO-MCNC: NORMAL MG/DL
KETONES UR STRIP.AUTO-MCNC: NEGATIVE MG/DL
LEUKOCYTE ESTERASE UR QL STRIP.AUTO: ABNORMAL
MUCOUS THREADS #/AREA URNS AUTO: ABNORMAL /LPF
NITRITE UR QL STRIP.AUTO: ABNORMAL
PH UR STRIP.AUTO: 6 [PH]
POC APPEARANCE, URINE: ABNORMAL
POC BILIRUBIN, URINE: ABNORMAL
POC BLOOD, URINE: ABNORMAL
POC COLOR, URINE: ABNORMAL
POC GLUCOSE, URINE: ABNORMAL MG/DL
POC KETONES, URINE: NEGATIVE MG/DL
POC PH, URINE: 5 PH
POC SPECIFIC GRAVITY, URINE: >=1.03
PROT UR STRIP.AUTO-MCNC: ABNORMAL MG/DL
RBC # UR STRIP.AUTO: ABNORMAL /UL
RBC #/AREA URNS AUTO: >20 /HPF
SP GR UR STRIP.AUTO: 1.02
SQUAMOUS #/AREA URNS AUTO: ABNORMAL /HPF
UROBILINOGEN UR STRIP.AUTO-MCNC: ABNORMAL MG/DL
WBC #/AREA URNS AUTO: >50 /HPF

## 2024-08-12 PROCEDURE — 87086 URINE CULTURE/COLONY COUNT: CPT

## 2024-08-12 PROCEDURE — 81001 URINALYSIS AUTO W/SCOPE: CPT

## 2024-08-12 PROCEDURE — 81002 URINALYSIS NONAUTO W/O SCOPE: CPT | Performed by: INTERNAL MEDICINE

## 2024-08-12 NOTE — TELEPHONE ENCOUNTER
----- Message from Madison Castellanos sent at 8/12/2024  2:27 PM EDT -----  Due to using azo prior to study we cannot interpret these results accurately   Please send urine out for urinalysis and culture-order was placed

## 2024-08-12 NOTE — TELEPHONE ENCOUNTER
Informed patient with urine results.  Will call her tomorrow with results of formal urinalysis and culture.

## 2024-08-12 NOTE — PROGRESS NOTES
Pt with UTI symptoms, came in to give urine sample however took Azo prior which alters results  Will send urine out for evaluation

## 2024-08-13 DIAGNOSIS — N30.00 ACUTE CYSTITIS WITHOUT HEMATURIA: Primary | ICD-10-CM

## 2024-08-13 LAB
BACTERIA UR CULT: NORMAL
HOLD SPECIMEN: NORMAL

## 2024-08-13 RX ORDER — SULFAMETHOXAZOLE AND TRIMETHOPRIM 800; 160 MG/1; MG/1
1 TABLET ORAL 2 TIMES DAILY
Qty: 6 TABLET | Refills: 0 | Status: SHIPPED | OUTPATIENT
Start: 2024-08-13 | End: 2024-08-16

## 2024-09-16 DIAGNOSIS — I10 BENIGN ESSENTIAL HYPERTENSION: Primary | ICD-10-CM

## 2024-09-16 RX ORDER — CHLORTHALIDONE 25 MG/1
25 TABLET ORAL DAILY
Qty: 90 TABLET | Refills: 3 | Status: SHIPPED | OUTPATIENT
Start: 2024-09-16

## 2025-03-10 ENCOUNTER — HOSPITAL ENCOUNTER (OUTPATIENT)
Dept: RADIOLOGY | Facility: CLINIC | Age: 45
Discharge: HOME | End: 2025-03-10
Payer: COMMERCIAL

## 2025-03-10 DIAGNOSIS — Z12.31 SCREENING MAMMOGRAM FOR BREAST CANCER: ICD-10-CM

## 2025-03-10 PROCEDURE — 77063 BREAST TOMOSYNTHESIS BI: CPT | Performed by: RADIOLOGY

## 2025-03-10 PROCEDURE — 77063 BREAST TOMOSYNTHESIS BI: CPT

## 2025-03-10 PROCEDURE — 77067 SCR MAMMO BI INCL CAD: CPT | Performed by: RADIOLOGY

## 2025-03-11 ENCOUNTER — HOSPITAL ENCOUNTER (OUTPATIENT)
Dept: RADIOLOGY | Facility: EXTERNAL LOCATION | Age: 45
Discharge: HOME | End: 2025-03-11

## 2025-05-01 DIAGNOSIS — F51.01 PRIMARY INSOMNIA: ICD-10-CM

## 2025-05-01 RX ORDER — TRAZODONE HYDROCHLORIDE 50 MG/1
50 TABLET ORAL NIGHTLY
Qty: 90 TABLET | Refills: 1 | Status: SHIPPED | OUTPATIENT
Start: 2025-05-01

## 2025-05-09 ENCOUNTER — HOSPITAL ENCOUNTER (OUTPATIENT)
Dept: RADIOLOGY | Facility: CLINIC | Age: 45
Discharge: HOME | End: 2025-05-09
Payer: COMMERCIAL

## 2025-05-09 DIAGNOSIS — E05.90 THYROTOXICOSIS, UNSPECIFIED WITHOUT THYROTOXIC CRISIS OR STORM: ICD-10-CM

## 2025-05-09 PROCEDURE — 76536 US EXAM OF HEAD AND NECK: CPT

## 2025-05-25 ENCOUNTER — APPOINTMENT (OUTPATIENT)
Dept: URGENT CARE | Age: 45
End: 2025-05-25
Payer: COMMERCIAL

## 2025-06-19 ENCOUNTER — APPOINTMENT (OUTPATIENT)
Dept: PRIMARY CARE | Facility: CLINIC | Age: 45
End: 2025-06-19
Payer: COMMERCIAL

## 2025-06-19 ENCOUNTER — TELEPHONE (OUTPATIENT)
Dept: PRIMARY CARE | Facility: CLINIC | Age: 45
End: 2025-06-19

## 2025-06-19 VITALS
BODY MASS INDEX: 29.7 KG/M2 | SYSTOLIC BLOOD PRESSURE: 103 MMHG | OXYGEN SATURATION: 98 % | WEIGHT: 161.4 LBS | HEART RATE: 85 BPM | TEMPERATURE: 98.9 F | DIASTOLIC BLOOD PRESSURE: 67 MMHG | RESPIRATION RATE: 14 BRPM | HEIGHT: 62 IN

## 2025-06-19 DIAGNOSIS — G43.009 MIGRAINE WITHOUT AURA AND WITHOUT STATUS MIGRAINOSUS, NOT INTRACTABLE: ICD-10-CM

## 2025-06-19 DIAGNOSIS — E78.00 ELEVATED LDL CHOLESTEROL LEVEL: ICD-10-CM

## 2025-06-19 DIAGNOSIS — F41.8 DEPRESSION WITH ANXIETY: ICD-10-CM

## 2025-06-19 DIAGNOSIS — Z00.00 ROUTINE GENERAL MEDICAL EXAMINATION AT A HEALTH CARE FACILITY: Primary | ICD-10-CM

## 2025-06-19 DIAGNOSIS — E66.3 OVERWEIGHT WITH BODY MASS INDEX (BMI) OF 29 TO 29.9 IN ADULT: ICD-10-CM

## 2025-06-19 DIAGNOSIS — E03.9 ACQUIRED HYPOTHYROIDISM: ICD-10-CM

## 2025-06-19 DIAGNOSIS — R31.1 BENIGN MICROSCOPIC HEMATURIA: ICD-10-CM

## 2025-06-19 DIAGNOSIS — I10 BENIGN ESSENTIAL HYPERTENSION: ICD-10-CM

## 2025-06-19 PROCEDURE — 3074F SYST BP LT 130 MM HG: CPT | Performed by: INTERNAL MEDICINE

## 2025-06-19 PROCEDURE — 99396 PREV VISIT EST AGE 40-64: CPT | Performed by: INTERNAL MEDICINE

## 2025-06-19 PROCEDURE — 3078F DIAST BP <80 MM HG: CPT | Performed by: INTERNAL MEDICINE

## 2025-06-19 PROCEDURE — 3008F BODY MASS INDEX DOCD: CPT | Performed by: INTERNAL MEDICINE

## 2025-06-19 PROCEDURE — 1036F TOBACCO NON-USER: CPT | Performed by: INTERNAL MEDICINE

## 2025-06-19 RX ORDER — LOSARTAN POTASSIUM 25 MG/1
1 TABLET ORAL
COMMUNITY
Start: 2025-05-02

## 2025-06-19 RX ORDER — LEVOTHYROXINE SODIUM 88 UG/1
88 TABLET ORAL DAILY
COMMUNITY

## 2025-06-19 ASSESSMENT — ENCOUNTER SYMPTOMS
DECREASED CONCENTRATION: 0
VOICE CHANGE: 0
TROUBLE SWALLOWING: 0
WOUND: 0
NUMBNESS: 0
FEVER: 0
DIAPHORESIS: 0
EYE REDNESS: 0
SLEEP DISTURBANCE: 0
MYALGIAS: 0
CHOKING: 0
APPETITE CHANGE: 0
SHORTNESS OF BREATH: 0
LIGHT-HEADEDNESS: 0
ABDOMINAL DISTENTION: 0
APNEA: 0
ABDOMINAL PAIN: 0
FATIGUE: 0
CHILLS: 0
HEADACHES: 0
UNEXPECTED WEIGHT CHANGE: 0
DYSURIA: 0
BRUISES/BLEEDS EASILY: 0
DYSPHORIC MOOD: 0
EYE PAIN: 0
VOMITING: 0
DIARRHEA: 0
RECTAL PAIN: 0
SINUS PAIN: 0
SORE THROAT: 1
ADENOPATHY: 0
POLYPHAGIA: 0
FACIAL SWELLING: 0
NAUSEA: 0
PALPITATIONS: 0
STRIDOR: 0
NERVOUS/ANXIOUS: 0
FLANK PAIN: 0
SPEECH DIFFICULTY: 0
ACTIVITY CHANGE: 0
CONFUSION: 0
HEMATURIA: 0
PHOTOPHOBIA: 0
NECK PAIN: 0
AGITATION: 0
RHINORRHEA: 0
FACIAL ASYMMETRY: 0
WHEEZING: 0
POLYDIPSIA: 0
EYE ITCHING: 0
TREMORS: 0
COUGH: 0
HALLUCINATIONS: 0
DIFFICULTY URINATING: 0
SINUS PRESSURE: 0
ARTHRALGIAS: 0
CONSTIPATION: 1
NECK STIFFNESS: 0
BACK PAIN: 1
ANAL BLEEDING: 0
COLOR CHANGE: 0
BLOOD IN STOOL: 0
HYPERACTIVE: 0
FREQUENCY: 0
CHEST TIGHTNESS: 0
SEIZURES: 0
WEAKNESS: 0
DIZZINESS: 0
JOINT SWELLING: 0
EYE DISCHARGE: 0

## 2025-06-19 ASSESSMENT — PATIENT HEALTH QUESTIONNAIRE - PHQ9
2. FEELING DOWN, DEPRESSED OR HOPELESS: NOT AT ALL
1. LITTLE INTEREST OR PLEASURE IN DOING THINGS: NOT AT ALL
SUM OF ALL RESPONSES TO PHQ9 QUESTIONS 1 AND 2: 0

## 2025-06-19 NOTE — ASSESSMENT & PLAN NOTE
Gave keto packet to review  Increase lean protein and lessen carbs/low to no sugar  Exercise more regularly

## 2025-06-19 NOTE — PATIENT INSTRUCTIONS
Continue all meds as directed  We will get results from [facility] on labs you had done throughout this year already  Recommend fiber in diet either via food or 1-2 table spoons of metamucil in drink daily to help with bowels  See Gyn annually and have mammogram done yearly  Continue to improve diet-increase protein (lean), more fish, veggies, lower carbs and less to no sugar   Exercise regularly goal of 150 minutes/week  Flu/covid boosters come fall if interested  Follow up here in 1 year

## 2025-06-19 NOTE — PROGRESS NOTES
Subjective   Patient ID: Carol Ch is a 44 y.o. female who presents for Annual Exam.    HPI  Pt here for full physical.  Her weight is up but doesn't feel she eats terribly.  She eats out occasionally but doesn't do fast food.  She tries to work out 2-3 times a week but sometimes this is hard.      Her mood is good and sleep has improved with medication.  She was seeing a counselor but hasn't in a while.      She goes to GYN and had visit in May.  No pelvic or urinary issues.  Minor stress incontinence with sneezing.    Had labs already done multiple times through different providers-had done in Feb, March and April.      She had normal mammogram in March of this year.  No breast issues.    She has some constipation-not straining when she goes but only goes 2 times a week.  She had colonoscopy in May and did have benign polyp.  Repeat noted for 5 years.      She sees chiropractor for low back issues.  No recent flares.      Review of Systems   Constitutional:  Negative for activity change, appetite change, chills, diaphoresis, fatigue, fever and unexpected weight change.   HENT:  Positive for sore throat. Negative for congestion, dental problem, drooling, ear discharge, ear pain, facial swelling, hearing loss, mouth sores, nosebleeds, postnasal drip, rhinorrhea, sinus pressure, sinus pain, sneezing, tinnitus, trouble swallowing and voice change.    Eyes:  Negative for photophobia, pain, discharge, redness, itching and visual disturbance (wears glasses for reading/driving-up to date).   Respiratory:  Negative for apnea, cough, choking, chest tightness, shortness of breath, wheezing and stridor.    Cardiovascular:  Negative for chest pain, palpitations and leg swelling.   Gastrointestinal:  Positive for constipation. Negative for abdominal distention, abdominal pain, anal bleeding, blood in stool, diarrhea, nausea, rectal pain and vomiting.   Endocrine: Negative for cold intolerance, heat intolerance,  "polydipsia, polyphagia and polyuria.   Genitourinary:  Negative for decreased urine volume, difficulty urinating, dyspareunia, dysuria, enuresis, flank pain, frequency, genital sores, hematuria, menstrual problem, pelvic pain, urgency, vaginal bleeding, vaginal discharge and vaginal pain.   Musculoskeletal:  Positive for back pain. Negative for arthralgias, gait problem, joint swelling, myalgias, neck pain and neck stiffness.   Skin:  Negative for color change, pallor, rash and wound.   Allergic/Immunologic: Positive for environmental allergies. Negative for food allergies and immunocompromised state.   Neurological:  Negative for dizziness, tremors, seizures, syncope, facial asymmetry, speech difficulty, weakness, light-headedness, numbness and headaches.   Hematological:  Negative for adenopathy. Does not bruise/bleed easily.   Psychiatric/Behavioral:  Negative for agitation, behavioral problems, confusion, decreased concentration, dysphoric mood, hallucinations, self-injury, sleep disturbance and suicidal ideas. The patient is not nervous/anxious and is not hyperactive.        Objective   /67 (BP Location: Right arm, Patient Position: Sitting)   Pulse 85   Temp 37.2 °C (98.9 °F) (Tympanic)   Resp 14   Ht 1.575 m (5' 2\")   Wt 73.2 kg (161 lb 6.4 oz)   SpO2 98%   BMI 29.52 kg/m²    Physical Exam  Constitutional:       Appearance: Normal appearance.   HENT:      Head: Normocephalic and atraumatic.      Right Ear: Tympanic membrane, ear canal and external ear normal. There is no impacted cerumen.      Left Ear: Tympanic membrane, ear canal and external ear normal. There is no impacted cerumen.      Nose: Nose normal. No congestion or rhinorrhea.      Mouth/Throat:      Mouth: Mucous membranes are moist.      Pharynx: Oropharynx is clear. No oropharyngeal exudate or posterior oropharyngeal erythema.   Eyes:      Extraocular Movements: Extraocular movements intact.      Conjunctiva/sclera: Conjunctivae " normal.      Pupils: Pupils are equal, round, and reactive to light.   Neck:      Vascular: No carotid bruit.   Cardiovascular:      Rate and Rhythm: Normal rate and regular rhythm.      Pulses: Normal pulses.      Heart sounds: Normal heart sounds. No murmur heard.  Pulmonary:      Effort: Pulmonary effort is normal. No respiratory distress.      Breath sounds: Normal breath sounds. No wheezing, rhonchi or rales.   Abdominal:      General: Abdomen is flat. Bowel sounds are normal. There is no distension.      Palpations: Abdomen is soft.      Tenderness: There is no abdominal tenderness.      Hernia: No hernia is present.   Musculoskeletal:         General: No swelling or tenderness. Normal range of motion.      Cervical back: Normal range of motion and neck supple.      Right lower leg: No edema.      Left lower leg: No edema.   Lymphadenopathy:      Cervical: No cervical adenopathy.   Skin:     General: Skin is warm and dry.      Findings: No lesion or rash.   Neurological:      General: No focal deficit present.      Mental Status: She is alert and oriented to person, place, and time.      Cranial Nerves: No cranial nerve deficit.      Sensory: No sensory deficit.      Motor: No weakness.   Psychiatric:         Mood and Affect: Mood normal.         Behavior: Behavior normal.         Thought Content: Thought content normal.         Judgment: Judgment normal.         Assessment/Plan   Problem List Items Addressed This Visit       Acquired hypothyroidism    On Levothyroxine  Had TSH done by GYN and it was noted to be normal-will get result hard copies         Benign essential hypertension    BP well controlled          Benign microscopic hematuria    Asymptomatic  Had labs already this year so will hold off on repeat urine at this time         Depression with anxiety    Mood stable on current regimen           Migraine    Hasn't had any headaches of late  Has nurtec if needed         Overweight with body mass index  (BMI) of 29 to 29.9 in adult    Gave keto packet to review  Increase lean protein and lessen carbs/low to no sugar  Exercise more regularly            Elevated LDL cholesterol level    Will get hard copy results of lipids from 2/2025 and compare   Improve diet-lean protein           Other Visit Diagnoses         Routine general medical examination at a health care facility    -  Primary    Relevant Orders    Follow Up In Primary Care - Health Maintenance

## 2025-07-25 ENCOUNTER — APPOINTMENT (OUTPATIENT)
Dept: ENDOCRINOLOGY | Facility: CLINIC | Age: 45
End: 2025-07-25
Payer: COMMERCIAL

## 2025-07-25 VITALS — BODY MASS INDEX: 29.81 KG/M2 | WEIGHT: 162 LBS | HEIGHT: 62 IN

## 2025-07-25 DIAGNOSIS — E03.9 ACQUIRED HYPOTHYROIDISM: Primary | ICD-10-CM

## 2025-07-25 DIAGNOSIS — E78.00 ELEVATED LDL CHOLESTEROL LEVEL: ICD-10-CM

## 2025-07-25 DIAGNOSIS — I10 BENIGN ESSENTIAL HYPERTENSION: ICD-10-CM

## 2025-07-25 PROCEDURE — 3008F BODY MASS INDEX DOCD: CPT | Performed by: INTERNAL MEDICINE

## 2025-07-25 PROCEDURE — 99204 OFFICE O/P NEW MOD 45 MIN: CPT | Performed by: INTERNAL MEDICINE

## 2025-07-25 RX ORDER — LEVOTHYROXINE SODIUM 100 UG/1
100 TABLET ORAL DAILY
Qty: 90 TABLET | Refills: 3 | Status: SHIPPED | OUTPATIENT
Start: 2025-07-25 | End: 2026-07-25

## 2025-07-25 NOTE — PROGRESS NOTES
Patient ID: Carol Ch is a 44 y.o. female who presents for New Patient Visit (Self referred for weight management).  HPI  The patient is referred for evaluation for weight loss.    This is a 44-year-old female who has had a problem weight her whole life but more recently despite watching her diet and exercising she has been unable to lose weight.    Weight is complicated by hypertension hyperlipidemia snoring and back pain.    She generally avoids skipping meals.    She does exercise 3-4 times per week.    She has a history of hypothyroidism and in February had a TSH of 3.15 but based on an elevated T4 and T3 her dose of 100 mcg was decreased to 88 mcg/day.    She also had an ultrasound in May that was normal.    She has been assessed for insulin resistance and does not have any indication by blood work.    She has a past history of hyperlipidemia hypertension depression.    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    Family history positive for hypothyroidism and a cousin and grandmother.    ROS  Comprehensive review of systems is negative.    Objective   Physical Exam  There were no vitals taken for this visit.   Vitals:    07/25/25 0840   Weight: 73.5 kg (162 lb)      Body mass index is 29.63 kg/m².      Alert and oriented x3  In no distress  No focal neurologic deficits  No supraclavicular, or dorsal fat  No purple striae  Integument intact  Eyes normal  ENT normal. No adenopathy  Thyroid palpable and normal. No nodules  Chest clear to auscultation  Heart sounds are normal  Abdomen nontender. Bowel sounds normal. No organomegaly  Feet are okay  Reflexes normal with normal return    Current Medications[1]    Assessment/Plan     1.  Hypothyroidism  2.  Hypertension  3.  Hyperlipidemia    We discussed her blood work from both February and April.    We discussed that based on TSH her dose should not have been adjusted back.    Will increase the thyroid back to 100  mcg/day.    We discussed the impact of thyroid and metabolism.    We discussed variables are going to thyroid hormone requirements.    We discussed thyroid symptoms as well as the nonspecificity.    We discussed weight.    There does not appear to be any endocrine cause for difficulty losing weight.    We discussed risk benefits and alternatives to weight loss medication.    Will try to prescribe Zepbound.    If it is not covered or cost prohibitive we discussed risk benefits alternatives to phentermine.    I have personally reviewed the [facility]AR[facility] report for this patient. This report is scanned into the electronic medical record. I consider the risks of abuse, dependence, addiction and diversion. I believe that is clinically appropriate for this patient to be prescribed this medication.    We discussed a weight loss goal of at least 5% in 3 months.    Will refer to nutrition.    She will follow-up with me in 3 months sooner as needed.             [1]   Current Outpatient Medications   Medication Sig Dispense Refill    buPROPion XL (Wellbutrin XL) 300 mg 24 hr tablet Take 1 tablet (300 mg) by mouth once daily in the morning.      clobetasol (Temovate) 0.05 % external solution APPLY AND GENTLY MASSAGE INTO AFFECTED AREA ONCE DAILY PRN 50 mL 3    hydrOXYzine HCL (Atarax) 25 mg tablet Take 1 tablet (25 mg) by mouth every 6 hours if needed for itching or allergies. 90 tablet 1    levothyroxine (Synthroid, Levoxyl) 88 mcg tablet Take 1 tablet (88 mcg) by mouth early in the morning.. Take on an empty stomach at the same time each day, either 30 to 60 minutes prior to breakfast      losartan (Cozaar) 25 mg tablet Take 1 tablet (25 mg) by mouth once daily.      rimegepant (NURTEC) 75 mg tablet,disintegrating Take 1 tablet (75 mg) by mouth once daily as needed (migraine). 8 tablet 1    traZODone (Desyrel) 50 mg tablet Take 1 tablet (50 mg) by mouth once daily at bedtime. 90 tablet 1     No current facility-administered  medications for this visit.

## 2025-08-05 DIAGNOSIS — F51.01 PRIMARY INSOMNIA: ICD-10-CM

## 2025-08-06 RX ORDER — TRAZODONE HYDROCHLORIDE 50 MG/1
50 TABLET ORAL NIGHTLY
Qty: 90 TABLET | Refills: 1 | Status: SHIPPED | OUTPATIENT
Start: 2025-08-06

## 2025-12-12 ENCOUNTER — APPOINTMENT (OUTPATIENT)
Dept: ENDOCRINOLOGY | Facility: CLINIC | Age: 45
End: 2025-12-12
Payer: COMMERCIAL

## 2026-06-23 ENCOUNTER — APPOINTMENT (OUTPATIENT)
Dept: PRIMARY CARE | Facility: CLINIC | Age: 46
End: 2026-06-23
Payer: COMMERCIAL